# Patient Record
Sex: MALE | Race: WHITE | NOT HISPANIC OR LATINO | Employment: FULL TIME | URBAN - METROPOLITAN AREA
[De-identification: names, ages, dates, MRNs, and addresses within clinical notes are randomized per-mention and may not be internally consistent; named-entity substitution may affect disease eponyms.]

---

## 2017-11-03 ENCOUNTER — OFFICE VISIT (OUTPATIENT)
Dept: OPTOMETRY | Facility: CLINIC | Age: 52
End: 2017-11-03
Payer: OTHER GOVERNMENT

## 2017-11-03 DIAGNOSIS — Z46.0 ENCOUNTER FOR FITTING OR ADJUSTMENT OF SPECTACLES OR CONTACT LENSES: ICD-10-CM

## 2017-11-03 DIAGNOSIS — H18.603 KERATOCONUS OF BOTH EYES: Primary | ICD-10-CM

## 2017-11-03 PROCEDURE — 99499 UNLISTED E&M SERVICE: CPT | Mod: S$PBB,,, | Performed by: OPTOMETRIST

## 2017-11-03 PROCEDURE — 99999 PR PBB SHADOW E&M-EST. PATIENT-LVL II: CPT | Mod: PBBFAC,,, | Performed by: OPTOMETRIST

## 2017-11-03 PROCEDURE — 92310 CONTACT LENS FITTING OU: CPT | Mod: S$PBB,,, | Performed by: OPTOMETRIST

## 2017-11-04 NOTE — PATIENT INSTRUCTIONS
Prior diagnosis of bilateral keratoconus.  General eye examination and refraction (without CLs) done recently by Dr. Vargas at [facility] eye clinic.  In today for updated CL Rx.   Currently wearing distance Essilor Artie scleral contact lenses with (spherical) distance power in each eye.  Power of CL in OD for distance okay as is, per spherical over-refraction.  Showing need for only minimal distance lens power (for distance) in the left lens.  No need for replacement of contact lens in either eye - good lens fit with present Essilor scleral lenses.  However, RevRobert Felipe desires pair of scleral lenses with near power in both lenses.  Showing need for astigmatic correction (in glasses) to be worn over CL in each eye.  Rev. Destinee understands need for glasses for all distance viewing if CLs are to be ordered with best near Rx in each lens.     New CL Rx entered into record with power for near (!) in each eye, as requested by patient.  New CL Rx to be sent to the CL department for ordering.  Schedule CL follow-up appointment for  of new lenses.

## 2017-11-05 NOTE — PROGRESS NOTES
HPI     Contact Lens Follow Up    Additional comments: Recently saw Dr. Vargas at  [facility].  Referred for CL follow-up/?refit by Dr. Vargas.  Recently received new spectacle lens Rx for use in lieu of CLs.  Wearing scleral RGP CLs. Happy with distance VA with CLs, but needs   reading glasses.              Comments   Patient's age: 52 y.o. WM   Occupation:  [employer]     Approximate date of last eye examination:  1 year ago  Name of last eye doctor seen:   Dr. Richard Valencia   City/State: [place]  Wears glasses? Yes     If yes, wears  Full-time or part-time?  Part Time  Present glasses are: Bifocal, SV Distance, SV Reading?  Bifocal  Approximate age of present glasses:    Got new glasses following last exam, or subsequently?:  Yes    Any problem with VA with glasses?  No  Wears CLs?:  Yes           If yes:              Type of CL worn:                Wears full-time or part-time:  Most of the time can wear them   for 12-16 hours               Sleeps with contact lenses:  No               CL Solution used:  Simplex Plus, and non-preserved saline for   lens insertion.                How often replace CLs:                Any problem with VA with CLs? No                Headaches?    Eye pain/discomfort?  No                                                                                Flashes?  No  Floaters?  No  Diplopia/Double vision?  Yes with clarity   Patient's Ocular History:         Any eye surgeries? No         Any eye injury?           Any treatment for eye disease?  Kerataconus  Family history of eye disease?  No  Significant patient medical history:         1. Diabetes? No         If yes, IDDM or NIDDM?    2. HBP?                3. Other (describe):     ! OTC eyedrops currently using:  No GTTS   ! Prescription eye meds currently using:  No GTTS   ! Any history of allergy/adverse reaction to any eye meds used   previously?  No   ! Any history of allergy/adverse reaction to eyedrops used during  prior   eye exam(s)? No    ! Any history of allergy/adverse reaction to Novacaine or similar meds?   No   ! Any history of allergy/adverse reaction to Epinephrine or similar meds?   No    ! Patient okay with use of anesthetic eyedrops to check eye pressure?    Yes        ! Patient okay with use of eyedrops to dilate pupils today?  Pt states he   was dilated about a month ago.   !  Allergies/Medications/Medical History/Family History reviewed today?    Yes           Last edited by Emigdio Flores, OD on 11/4/2017  7:58 PM. (History)            Assessment /Plan     For exam results, see Encounter Report.    1. Keratoconus of both eyes     2. Encounter for fitting or adjustment of spectacles or contact lenses                  Prior diagnosis of bilateral keratoconus.  General eye examination and refraction (without CLs) done recently by Dr. Vargas at [facility] eye clinic.  In today for updated CL Rx.   Currently wearing distance Essilor scleral contact lenses with (spherical) distance power in each eye.  Power of CL in OD for distance okay as is, per spherical over-refraction.  Showing need for only minimal distance lens power (for distance) in the left lens.  No need for replacement of contact lens in either eye - good lens fit with present Essilor scleral lenses.  However, Rev. Destinee desires pair of scleral lenses with near power in both lenses.  Showing need for astigmatic correction (in glasses) to be worn over CL in each eye.  RevRobert Felipe understands need for glasses for all distance viewing if CLs are to be ordered with best near Rx in each lens.     New CL Rx entered into record with power for near (!) in each eye, as requested by patient.  New CL Rx to be sent to the CL department for ordering.  Schedule CL follow-up appointment for  of new lenses.

## 2017-11-16 ENCOUNTER — DOCUMENTATION ONLY (OUTPATIENT)
Dept: OPTOMETRY | Facility: CLINIC | Age: 52
End: 2017-11-16

## 2017-11-16 NOTE — PROGRESS NOTES
Assessment /Plan     For exam results, see Encounter Report.    Bialteral keratoconus.  Wears RGP CLs            Refer to previous optometry encounter notes dated 11/03/2017.  Received request from Rev. Felipe for Rx for glasses to use over his distance pair of RGP CLs, as he has opted not to proceed with ordering a pair of CLs with near RX, as previously discussed.    Will modify CL Rx accordingly, with distance Rx in each lens, as he is currently wearing, and will enter into the record of 11/03/2017 a prescription for spectacle lenses with astigmatic correction in each lens for use over his present RGP lenses (for distance and for near).    Emigdio Flores, OD

## 2019-08-16 ENCOUNTER — TELEPHONE (OUTPATIENT)
Dept: OPHTHALMOLOGY | Facility: CLINIC | Age: 54
End: 2019-08-16

## 2019-08-16 NOTE — TELEPHONE ENCOUNTER
Spoke with patient and told him we did not receive his referral yet. Pt stated he will call back next week or I told him once I get I will give him a call to schedule appt. SDF

## 2019-08-16 NOTE — TELEPHONE ENCOUNTER
----- Message from Beverley Benavides sent at 8/16/2019  3:31 PM CDT -----  Contact: Chayo  Can you check and see if we have paperwork on him.      ----- Message -----  From: Bianca Hernandez  Sent: 8/16/2019   2:29 PM  To: [facility] Staff    Needs Advice    Reason for call: Pt was calling to see if our office received his office referral.         Communication Preference: [phone]    Additional Information:

## 2019-09-13 ENCOUNTER — TELEPHONE (OUTPATIENT)
Dept: OPTOMETRY | Facility: CLINIC | Age: 54
End: 2019-09-13

## 2019-09-13 NOTE — TELEPHONE ENCOUNTER
Returned patient call to schedule appointment for specialty lens contact fit. I informed to patient the first available was 10/28/2019.  Pt was last seen in clinic by  was 11/03/2017. Pt refused dated that was offered.

## 2019-09-20 ENCOUNTER — OFFICE VISIT (OUTPATIENT)
Dept: OPHTHALMOLOGY | Facility: CLINIC | Age: 54
End: 2019-09-20
Payer: OTHER GOVERNMENT

## 2019-09-20 DIAGNOSIS — H18.603 KERATOCONUS OF BOTH EYES: Primary | ICD-10-CM

## 2019-09-20 PROCEDURE — 92004 PR EYE EXAM, NEW PATIENT,COMPREHESV: ICD-10-PCS | Mod: S$PBB,,, | Performed by: OPHTHALMOLOGY

## 2019-09-20 PROCEDURE — 99212 OFFICE O/P EST SF 10 MIN: CPT | Mod: PBBFAC | Performed by: OPHTHALMOLOGY

## 2019-09-20 PROCEDURE — 92004 COMPRE OPH EXAM NEW PT 1/>: CPT | Mod: S$PBB,,, | Performed by: OPHTHALMOLOGY

## 2019-09-20 PROCEDURE — 99999 PR PBB SHADOW E&M-EST. PATIENT-LVL II: ICD-10-PCS | Mod: PBBFAC,,, | Performed by: OPHTHALMOLOGY

## 2019-09-20 PROCEDURE — 99999 PR PBB SHADOW E&M-EST. PATIENT-LVL II: CPT | Mod: PBBFAC,,, | Performed by: OPHTHALMOLOGY

## 2019-09-20 NOTE — PROGRESS NOTES
HPI     Pt was referred by Dr. Flores for keratoconus OU back in 2017.    Pt states that he was told that he had keratoconus years back when he   wanted to have PRK. Pt states that he does wear scleral lens but states in   the OD it does not fit as well. Pt states that he believes his keratoconus   is stable and is not sure if he will need CXL. Pt just recently got fitted   for new glasses and so far these are the nest pair of glasses he has   gotten. Denies flashes or floaters OU. Pt admits that he would rub his   eyes a lot and he does take zyrtec almost everyday and sometimes multiple   times a day.     Last edited by Leyda Cedeno on 9/20/2019  9:45 AM. (History)            Assessment /Plan     For exam results, see Encounter Report.    Keratoconus of both eyes      The diagnosis of corneal ectasia and its etiology and clinical course were discussed.  Treatment with the use of specialty contact lenses, collagen cross linking, and corneal transplantation was explained in detail.

## 2019-09-20 NOTE — LETTER
[facility]  Ophthalmology  [address]  [facility] [address]  Phone: [phone]  Fax: [phone]   September 20, 2019    Diego Linder   [facility] [place] [address]    Patient: Chayo Felipe   MR Number: [id number]   YOB: 1965   Date of Visit: 9/20/2019       Dear Dr. Linder :    Thank you for referring Chayo Felipe to me for evaluation. Here is my assessment and plan of care:    Keratoconus of both eyes      The diagnosis of corneal ectasia and its etiology and clinical course were discussed.  Treatment with the use of specialty contact lenses, collagen cross linking, and corneal transplantation was explained in detail, but Mr Felipe has stable KCN at 55 y/o and does not need any treatment except for CTLs.       If you have questions, please do not hesitate to call me. I look forward to following Chayo Felipe along with you.    Sincerely,        MD VADIM Burdick